# Patient Record
Sex: FEMALE | Race: WHITE | NOT HISPANIC OR LATINO | Employment: OTHER | URBAN - NONMETROPOLITAN AREA
[De-identification: names, ages, dates, MRNs, and addresses within clinical notes are randomized per-mention and may not be internally consistent; named-entity substitution may affect disease eponyms.]

---

## 2023-01-01 ENCOUNTER — APPOINTMENT (OUTPATIENT)
Dept: PRIMARY CARE | Facility: CLINIC | Age: 88
End: 2023-01-01
Payer: MEDICARE

## 2023-01-01 ENCOUNTER — TELEPHONE (OUTPATIENT)
Dept: PRIMARY CARE | Facility: CLINIC | Age: 88
End: 2023-01-01
Payer: MEDICARE

## 2023-01-01 ENCOUNTER — NURSING HOME VISIT (OUTPATIENT)
Dept: POST ACUTE CARE | Facility: EXTERNAL LOCATION | Age: 88
End: 2023-01-01
Payer: MEDICARE

## 2023-01-01 ENCOUNTER — OFFICE VISIT (OUTPATIENT)
Dept: PRIMARY CARE | Facility: CLINIC | Age: 88
End: 2023-01-01
Payer: MEDICARE

## 2023-01-01 ENCOUNTER — HOSPITAL ENCOUNTER (OUTPATIENT)
Dept: DATA CONVERSION | Facility: HOSPITAL | Age: 88
End: 2023-05-05
Attending: PAIN MEDICINE | Admitting: PAIN MEDICINE
Payer: MEDICARE

## 2023-01-01 VITALS
HEIGHT: 66 IN | HEART RATE: 72 BPM | BODY MASS INDEX: 20.89 KG/M2 | SYSTOLIC BLOOD PRESSURE: 110 MMHG | OXYGEN SATURATION: 98 % | DIASTOLIC BLOOD PRESSURE: 72 MMHG | WEIGHT: 130 LBS

## 2023-01-01 VITALS
HEART RATE: 68 BPM | DIASTOLIC BLOOD PRESSURE: 74 MMHG | BODY MASS INDEX: 21.16 KG/M2 | HEIGHT: 65 IN | SYSTOLIC BLOOD PRESSURE: 122 MMHG | WEIGHT: 127 LBS

## 2023-01-01 VITALS — BODY MASS INDEX: 21.26 KG/M2 | WEIGHT: 132.28 LBS | HEIGHT: 66 IN

## 2023-01-01 DIAGNOSIS — R52 PAIN, UNSPECIFIED: ICD-10-CM

## 2023-01-01 DIAGNOSIS — M54.14 RADICULOPATHY, THORACIC REGION: ICD-10-CM

## 2023-01-01 DIAGNOSIS — I63.9 CEREBROVASCULAR ACCIDENT (CVA), UNSPECIFIED MECHANISM (MULTI): Primary | ICD-10-CM

## 2023-01-01 DIAGNOSIS — I50.30 UNSPECIFIED DIASTOLIC (CONGESTIVE) HEART FAILURE (MULTI): ICD-10-CM

## 2023-01-01 DIAGNOSIS — I13.0 HYPERTENSIVE HEART AND CHRONIC KIDNEY DISEASE WITH HEART FAILURE AND STAGE 1 THROUGH STAGE 4 CHRONIC KIDNEY DISEASE, OR UNSPECIFIED CHRONIC KIDNEY DISEASE (MULTI): ICD-10-CM

## 2023-01-01 DIAGNOSIS — R63.0 POOR APPETITE: ICD-10-CM

## 2023-01-01 DIAGNOSIS — D64.9 ANEMIA, UNSPECIFIED TYPE: ICD-10-CM

## 2023-01-01 DIAGNOSIS — I10 BENIGN ESSENTIAL HTN: ICD-10-CM

## 2023-01-01 DIAGNOSIS — S22.31XA CLOSED FRACTURE OF ONE RIB OF RIGHT SIDE, INITIAL ENCOUNTER: ICD-10-CM

## 2023-01-01 DIAGNOSIS — M54.50 CHRONIC MIDLINE LOW BACK PAIN WITHOUT SCIATICA: ICD-10-CM

## 2023-01-01 DIAGNOSIS — R53.83 FATIGUE, UNSPECIFIED TYPE: ICD-10-CM

## 2023-01-01 DIAGNOSIS — N18.30 CRF (CHRONIC RENAL FAILURE), STAGE 3 (MODERATE) (MULTI): ICD-10-CM

## 2023-01-01 DIAGNOSIS — M54.50 CHRONIC BILATERAL LOW BACK PAIN WITHOUT SCIATICA: ICD-10-CM

## 2023-01-01 DIAGNOSIS — M80.00XA: ICD-10-CM

## 2023-01-01 DIAGNOSIS — R53.81 DEBILITY: ICD-10-CM

## 2023-01-01 DIAGNOSIS — Z12.31 ENCOUNTER FOR SCREENING MAMMOGRAM FOR MALIGNANT NEOPLASM OF BREAST: Primary | ICD-10-CM

## 2023-01-01 DIAGNOSIS — E46 PROTEIN-CALORIE MALNUTRITION, UNSPECIFIED SEVERITY (MULTI): ICD-10-CM

## 2023-01-01 DIAGNOSIS — S22.080A COMPRESSION FRACTURE OF T11 VERTEBRA, INITIAL ENCOUNTER (MULTI): ICD-10-CM

## 2023-01-01 DIAGNOSIS — R13.19 ESOPHAGEAL DYSPHAGIA: Primary | ICD-10-CM

## 2023-01-01 DIAGNOSIS — R53.83 FATIGUE, UNSPECIFIED TYPE: Primary | ICD-10-CM

## 2023-01-01 DIAGNOSIS — S22.069A: ICD-10-CM

## 2023-01-01 DIAGNOSIS — M54.6 CHRONIC BILATERAL THORACIC BACK PAIN: ICD-10-CM

## 2023-01-01 DIAGNOSIS — G89.29 CHRONIC MIDLINE LOW BACK PAIN WITHOUT SCIATICA: ICD-10-CM

## 2023-01-01 DIAGNOSIS — M48.05 SPINAL STENOSIS OF THORACOLUMBAR REGION: ICD-10-CM

## 2023-01-01 DIAGNOSIS — N18.9 CHRONIC KIDNEY DISEASE, UNSPECIFIED: ICD-10-CM

## 2023-01-01 DIAGNOSIS — I48.91 UNSPECIFIED ATRIAL FIBRILLATION (MULTI): ICD-10-CM

## 2023-01-01 DIAGNOSIS — S22.089A UNSPECIFIED FRACTURE OF T11-T12 VERTEBRA, INITIAL ENCOUNTER FOR CLOSED FRACTURE (MULTI): ICD-10-CM

## 2023-01-01 DIAGNOSIS — Z78.0 POST-MENOPAUSAL: ICD-10-CM

## 2023-01-01 DIAGNOSIS — R93.89 ABNORMAL CXR: Primary | ICD-10-CM

## 2023-01-01 DIAGNOSIS — G89.29 CHRONIC BILATERAL THORACIC BACK PAIN: ICD-10-CM

## 2023-01-01 DIAGNOSIS — M19.90 UNSPECIFIED OSTEOARTHRITIS, UNSPECIFIED SITE: ICD-10-CM

## 2023-01-01 DIAGNOSIS — M47.24 OSTEOARTHRITIS OF SPINE WITH RADICULOPATHY, THORACIC REGION: ICD-10-CM

## 2023-01-01 DIAGNOSIS — S22.079A: ICD-10-CM

## 2023-01-01 DIAGNOSIS — S32.000D COMPRESSION FRACTURE OF LUMBAR VERTEBRA WITH ROUTINE HEALING, UNSPECIFIED LUMBAR VERTEBRAL LEVEL, SUBSEQUENT ENCOUNTER: ICD-10-CM

## 2023-01-01 DIAGNOSIS — G47.33 OBSTRUCTIVE SLEEP APNEA (ADULT) (PEDIATRIC): ICD-10-CM

## 2023-01-01 DIAGNOSIS — Z00.00 ROUTINE GENERAL MEDICAL EXAMINATION AT HEALTH CARE FACILITY: ICD-10-CM

## 2023-01-01 DIAGNOSIS — G89.29 CHRONIC BILATERAL LOW BACK PAIN WITHOUT SCIATICA: ICD-10-CM

## 2023-01-01 DIAGNOSIS — I70.1 RAS (RENAL ARTERY STENOSIS) (CMS-HCC): ICD-10-CM

## 2023-01-01 DIAGNOSIS — I34.0 NONRHEUMATIC MITRAL (VALVE) INSUFFICIENCY: ICD-10-CM

## 2023-01-01 LAB
ALANINE AMINOTRANSFERASE (SGPT) (U/L) IN SER/PLAS: 18 U/L (ref 7–45)
ALBUMIN (G/DL) IN SER/PLAS: 2.9 G/DL (ref 3.4–5)
ALKALINE PHOSPHATASE (U/L) IN SER/PLAS: 74 U/L (ref 33–136)
AMMONIA (UMOL/L) IN PLASMA: 50 UMOL/L
ANION GAP IN SER/PLAS: 10 MMOL/L (ref 10–20)
ANION GAP IN SER/PLAS: 15 MMOL/L (ref 10–20)
ASPARTATE AMINOTRANSFERASE (SGOT) (U/L) IN SER/PLAS: 36 U/L (ref 9–39)
BILIRUBIN TOTAL (MG/DL) IN SER/PLAS: 1 MG/DL (ref 0–1.2)
CALCIUM (MG/DL) IN SER/PLAS: 9.2 MG/DL (ref 8.6–10.3)
CALCIUM (MG/DL) IN SER/PLAS: 9.2 MG/DL (ref 8.6–10.3)
CARBON DIOXIDE, TOTAL (MMOL/L) IN SER/PLAS: 23 MMOL/L (ref 21–32)
CARBON DIOXIDE, TOTAL (MMOL/L) IN SER/PLAS: 26 MMOL/L (ref 21–32)
CHLORIDE (MMOL/L) IN SER/PLAS: 105 MMOL/L (ref 98–107)
CHLORIDE (MMOL/L) IN SER/PLAS: 108 MMOL/L (ref 98–107)
CREATININE (MG/DL) IN SER/PLAS: 0.84 MG/DL (ref 0.5–1.05)
CREATININE (MG/DL) IN SER/PLAS: 1.23 MG/DL (ref 0.5–1.05)
GFR FEMALE: 43 ML/MIN/1.73M2
GFR FEMALE: 67 ML/MIN/1.73M2
GLUCOSE (MG/DL) IN SER/PLAS: 100 MG/DL (ref 74–99)
GLUCOSE (MG/DL) IN SER/PLAS: 130 MG/DL (ref 74–99)
POTASSIUM (MMOL/L) IN SER/PLAS: 4 MMOL/L (ref 3.5–5.3)
POTASSIUM (MMOL/L) IN SER/PLAS: 4 MMOL/L (ref 3.5–5.3)
PROTEIN TOTAL: 5.4 G/DL (ref 6.4–8.2)
SODIUM (MMOL/L) IN SER/PLAS: 137 MMOL/L (ref 136–145)
SODIUM (MMOL/L) IN SER/PLAS: 142 MMOL/L (ref 136–145)
URATE (MG/DL) IN SER/PLAS: 4.6 MG/DL (ref 2.3–6.7)
UREA NITROGEN (MG/DL) IN SER/PLAS: 22 MG/DL (ref 6–23)
UREA NITROGEN (MG/DL) IN SER/PLAS: 34 MG/DL (ref 6–23)

## 2023-01-01 PROCEDURE — 99308 SBSQ NF CARE LOW MDM 20: CPT | Performed by: INTERNAL MEDICINE

## 2023-01-01 PROCEDURE — 3078F DIAST BP <80 MM HG: CPT | Performed by: INTERNAL MEDICINE

## 2023-01-01 PROCEDURE — 99214 OFFICE O/P EST MOD 30 MIN: CPT | Performed by: INTERNAL MEDICINE

## 2023-01-01 PROCEDURE — 3074F SYST BP LT 130 MM HG: CPT | Performed by: INTERNAL MEDICINE

## 2023-01-01 PROCEDURE — 1160F RVW MEDS BY RX/DR IN RCRD: CPT | Performed by: INTERNAL MEDICINE

## 2023-01-01 PROCEDURE — 99304 1ST NF CARE SF/LOW MDM 25: CPT | Performed by: INTERNAL MEDICINE

## 2023-01-01 PROCEDURE — 1036F TOBACCO NON-USER: CPT | Performed by: INTERNAL MEDICINE

## 2023-01-01 PROCEDURE — 1159F MED LIST DOCD IN RCRD: CPT | Performed by: INTERNAL MEDICINE

## 2023-01-01 PROCEDURE — 99497 ADVNCD CARE PLAN 30 MIN: CPT | Performed by: INTERNAL MEDICINE

## 2023-01-01 PROCEDURE — G0439 PPPS, SUBSEQ VISIT: HCPCS | Performed by: INTERNAL MEDICINE

## 2023-01-01 PROCEDURE — 1170F FXNL STATUS ASSESSED: CPT | Performed by: INTERNAL MEDICINE

## 2023-01-01 RX ORDER — FENOFIBRATE 145 MG/1
1 TABLET, FILM COATED ORAL DAILY
COMMUNITY

## 2023-01-01 RX ORDER — FUROSEMIDE 20 MG/1
1 TABLET ORAL DAILY
COMMUNITY
End: 2023-01-01 | Stop reason: WASHOUT

## 2023-01-01 RX ORDER — DILTIAZEM HYDROCHLORIDE 240 MG/1
1 CAPSULE, EXTENDED RELEASE ORAL
COMMUNITY

## 2023-01-01 RX ORDER — LIDOCAINE 50 MG/G
1 PATCH TOPICAL DAILY
Qty: 30 PATCH | Refills: 11 | Status: SHIPPED | OUTPATIENT
Start: 2023-01-01 | End: 2023-09-20 | Stop reason: CLARIF

## 2023-01-01 RX ORDER — LIDOCAINE 50 MG/G
1 PATCH TOPICAL DAILY
Qty: 30 PATCH | Refills: 11 | Status: SHIPPED | OUTPATIENT
Start: 2023-01-01 | End: 2023-01-01 | Stop reason: SDUPTHER

## 2023-01-01 RX ORDER — CYCLOBENZAPRINE HCL 10 MG
10 TABLET ORAL 3 TIMES DAILY PRN
COMMUNITY
End: 2023-01-01 | Stop reason: WASHOUT

## 2023-01-01 RX ORDER — DOCUSATE SODIUM 100 MG/1
1 CAPSULE, LIQUID FILLED ORAL 2 TIMES DAILY PRN
COMMUNITY

## 2023-01-01 RX ORDER — ALENDRONATE SODIUM 70 MG/1
70 TABLET ORAL
Qty: 4 TABLET | Refills: 11 | Status: SHIPPED | OUTPATIENT
Start: 2023-01-01 | End: 2023-09-20 | Stop reason: CLARIF

## 2023-01-01 RX ORDER — LISINOPRIL 40 MG/1
1 TABLET ORAL DAILY
COMMUNITY

## 2023-01-01 RX ORDER — CARVEDILOL 12.5 MG/1
1 TABLET ORAL 2 TIMES DAILY
COMMUNITY

## 2023-01-01 RX ORDER — CELECOXIB 100 MG/1
1 CAPSULE ORAL EVERY 12 HOURS PRN
COMMUNITY
End: 2023-01-01 | Stop reason: WASHOUT

## 2023-01-01 RX ORDER — WARFARIN 3 MG/1
TABLET ORAL
COMMUNITY

## 2023-01-01 ASSESSMENT — ENCOUNTER SYMPTOMS
WEAKNESS: 0
CHILLS: 0
DYSURIA: 0
AGITATION: 0
ABDOMINAL DISTENTION: 0
SHORTNESS OF BREATH: 0
PALPITATIONS: 0
GASTROINTESTINAL NEGATIVE: 1
NEUROLOGICAL NEGATIVE: 1
WHEEZING: 0
VOMITING: 0
DIZZINESS: 0
WEAKNESS: 0
FEVER: 0
CONSTIPATION: 0
PSYCHIATRIC NEGATIVE: 1
AGITATION: 0
CONSTIPATION: 0
DYSURIA: 0
NAUSEA: 0
ENDOCRINE NEGATIVE: 1
FEVER: 0
GASTROINTESTINAL NEGATIVE: 1
ABDOMINAL PAIN: 0
LIGHT-HEADEDNESS: 0
VOMITING: 0
NAUSEA: 0
LIGHT-HEADEDNESS: 0
HEADACHES: 0
BACK PAIN: 1
WHEEZING: 0
FATIGUE: 1
BACK PAIN: 1
CARDIOVASCULAR NEGATIVE: 1
EYES NEGATIVE: 1
PSYCHIATRIC NEGATIVE: 1
CHILLS: 0
CARDIOVASCULAR NEGATIVE: 1
EYES NEGATIVE: 1
COUGH: 0
DIZZINESS: 0
HEADACHES: 0
ENDOCRINE NEGATIVE: 1
DIARRHEA: 0
DIARRHEA: 0
NEUROLOGICAL NEGATIVE: 1
PALPITATIONS: 0
RHINORRHEA: 0
SHORTNESS OF BREATH: 0
RHINORRHEA: 0
ABDOMINAL PAIN: 0
ABDOMINAL DISTENTION: 0
COUGH: 0

## 2023-01-01 ASSESSMENT — PATIENT HEALTH QUESTIONNAIRE - PHQ9
SUM OF ALL RESPONSES TO PHQ9 QUESTIONS 1 AND 2: 0
2. FEELING DOWN, DEPRESSED OR HOPELESS: SEVERAL DAYS
SUM OF ALL RESPONSES TO PHQ9 QUESTIONS 1 AND 2: 0
1. LITTLE INTEREST OR PLEASURE IN DOING THINGS: NOT AT ALL
1. LITTLE INTEREST OR PLEASURE IN DOING THINGS: NOT AT ALL
2. FEELING DOWN, DEPRESSED OR HOPELESS: NOT AT ALL
SUM OF ALL RESPONSES TO PHQ9 QUESTIONS 1 AND 2: 2
1. LITTLE INTEREST OR PLEASURE IN DOING THINGS: SEVERAL DAYS
2. FEELING DOWN, DEPRESSED OR HOPELESS: NOT AT ALL

## 2023-01-01 ASSESSMENT — ACTIVITIES OF DAILY LIVING (ADL)
TAKING_MEDICATION: NEEDS ASSISTANCE
DOING_HOUSEWORK: NEEDS ASSISTANCE
MANAGING_FINANCES: NEEDS ASSISTANCE
MANAGING_FINANCES: NEEDS ASSISTANCE
TAKING_MEDICATION: NEEDS ASSISTANCE
GROCERY_SHOPPING: NEEDS ASSISTANCE
DRESSING: INDEPENDENT
DOING_HOUSEWORK: NEEDS ASSISTANCE
BATHING: NEEDS ASSISTANCE
DRESSING: INDEPENDENT
GROCERY_SHOPPING: NEEDS ASSISTANCE
BATHING: NEEDS ASSISTANCE

## 2023-02-10 PROBLEM — M75.42 IMPINGEMENT SYNDROME OF LEFT SHOULDER: Status: ACTIVE | Noted: 2023-01-01

## 2023-02-10 PROBLEM — M19.90 ARTHRITIS: Status: ACTIVE | Noted: 2023-01-01

## 2023-02-10 PROBLEM — M54.6 THORACIC BACK PAIN: Status: ACTIVE | Noted: 2023-01-01

## 2023-02-10 PROBLEM — N17.9 AKI (ACUTE KIDNEY INJURY) (CMS-HCC): Status: ACTIVE | Noted: 2023-01-01

## 2023-02-10 PROBLEM — S93.401A SPRAIN OF ANKLE, RIGHT: Status: RESOLVED | Noted: 2023-01-01 | Resolved: 2023-01-01

## 2023-02-10 PROBLEM — S52.601A CLOSED FRACTURE OF LOWER END OF RIGHT ULNA: Status: ACTIVE | Noted: 2023-01-01

## 2023-02-10 PROBLEM — S93.401A SPRAIN OF ANKLE, RIGHT: Status: ACTIVE | Noted: 2023-01-01

## 2023-02-10 PROBLEM — M25.551 HIP PAIN, RIGHT: Status: ACTIVE | Noted: 2023-01-01

## 2023-02-10 PROBLEM — M54.50 LUMBOSACRAL PAIN: Status: RESOLVED | Noted: 2023-01-01 | Resolved: 2023-01-01

## 2023-02-10 PROBLEM — I50.20 SYSTOLIC CHF (MULTI): Status: ACTIVE | Noted: 2023-01-01

## 2023-02-10 PROBLEM — R63.0 POOR APPETITE: Status: ACTIVE | Noted: 2023-01-01

## 2023-02-10 PROBLEM — J90 PLEURAL EFFUSION: Status: RESOLVED | Noted: 2023-01-01 | Resolved: 2023-01-01

## 2023-02-10 PROBLEM — M54.50 LUMBOSACRAL PAIN: Status: ACTIVE | Noted: 2023-01-01

## 2023-02-10 PROBLEM — J40 BRONCHITIS: Status: RESOLVED | Noted: 2023-01-01 | Resolved: 2023-01-01

## 2023-02-10 PROBLEM — E78.5 HYPERLIPIDEMIA: Status: ACTIVE | Noted: 2023-01-01

## 2023-02-10 PROBLEM — J90 PLEURAL EFFUSION: Status: ACTIVE | Noted: 2023-01-01

## 2023-02-10 PROBLEM — N17.9 AKI (ACUTE KIDNEY INJURY) (CMS-HCC): Status: RESOLVED | Noted: 2023-01-01 | Resolved: 2023-01-01

## 2023-02-10 PROBLEM — N39.0 ACUTE UTI: Status: RESOLVED | Noted: 2023-01-01 | Resolved: 2023-01-01
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
## 2023-02-10 PROBLEM — N39.3 SUI (STRESS URINARY INCONTINENCE, FEMALE): Status: ACTIVE | Noted: 2023-01-01

## 2023-02-10 PROBLEM — M54.9 BACK PAIN: Status: ACTIVE | Noted: 2023-01-01

## 2023-02-10 PROBLEM — I48.91 ATRIAL FIBRILLATION (MULTI): Status: ACTIVE | Noted: 2023-01-01

## 2023-02-10 PROBLEM — M25.531 ACUTE PAIN OF RIGHT WRIST: Status: RESOLVED | Noted: 2023-01-01 | Resolved: 2023-01-01

## 2023-02-10 PROBLEM — N18.32 STAGE 3B CHRONIC KIDNEY DISEASE (MULTI): Status: RESOLVED | Noted: 2023-01-01 | Resolved: 2023-01-01

## 2023-02-10 PROBLEM — M47.814 DEGENERATIVE JOINT DISEASE OF THORACIC SPINE: Status: ACTIVE | Noted: 2023-01-01

## 2023-02-10 PROBLEM — N18.9 CRD (CHRONIC RENAL DISEASE): Status: ACTIVE | Noted: 2023-01-01

## 2023-02-10 PROBLEM — I10 HYPERTENSION, MALIGNANT: Status: ACTIVE | Noted: 2023-01-01

## 2023-02-10 PROBLEM — T07.XXXA MULTIPLE FRACTURE: Status: RESOLVED | Noted: 2023-01-01 | Resolved: 2023-01-01
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
## 2023-02-10 PROBLEM — N18.30 CRF (CHRONIC RENAL FAILURE), STAGE 3 (MODERATE) (MULTI): Status: ACTIVE | Noted: 2023-01-01

## 2023-02-10 PROBLEM — R79.89 ELEVATED SERUM CREATININE: Status: RESOLVED | Noted: 2023-01-01 | Resolved: 2023-01-01

## 2023-02-10 PROBLEM — I10 BENIGN ESSENTIAL HTN: Status: ACTIVE | Noted: 2023-01-01

## 2023-02-10 PROBLEM — R63.4 WEIGHT LOSS: Status: ACTIVE | Noted: 2023-01-01

## 2023-02-10 PROBLEM — M79.601 PAIN OF RIGHT UPPER EXTREMITY: Status: ACTIVE | Noted: 2023-01-01

## 2023-02-10 PROBLEM — S20.211A RIB CONTUSION, RIGHT, INITIAL ENCOUNTER: Status: ACTIVE | Noted: 2023-01-01

## 2023-02-10 PROBLEM — N18.32 STAGE 3B CHRONIC KIDNEY DISEASE (MULTI): Status: ACTIVE | Noted: 2023-01-01

## 2023-02-10 PROBLEM — I50.30 (HFPEF) HEART FAILURE WITH PRESERVED EJECTION FRACTION (MULTI): Status: ACTIVE | Noted: 2023-01-01

## 2023-02-10 PROBLEM — R79.89 ELEVATED SERUM CREATININE: Status: ACTIVE | Noted: 2023-01-01

## 2023-02-10 PROBLEM — M54.50 CHRONIC LOW BACK PAIN: Status: ACTIVE | Noted: 2023-01-01

## 2023-02-10 PROBLEM — R53.83 FATIGUE: Status: ACTIVE | Noted: 2023-01-01

## 2023-02-10 PROBLEM — Z99.89 CPAP (CONTINUOUS POSITIVE AIRWAY PRESSURE) DEPENDENCE: Status: ACTIVE | Noted: 2023-01-01

## 2023-02-10 PROBLEM — J40 BRONCHITIS: Status: ACTIVE | Noted: 2023-01-01

## 2023-02-10 PROBLEM — Z99.89 CPAP (CONTINUOUS POSITIVE AIRWAY PRESSURE) DEPENDENCE: Status: RESOLVED | Noted: 2023-01-01 | Resolved: 2023-01-01

## 2023-02-10 PROBLEM — G47.33 OBSTRUCTIVE SLEEP APNEA, ADULT: Status: ACTIVE | Noted: 2023-01-01

## 2023-02-10 PROBLEM — S20.01XA CONTUSION OF RIGHT BREAST: Status: RESOLVED | Noted: 2023-01-01 | Resolved: 2023-01-01

## 2023-02-10 PROBLEM — I10 HYPERTENSION, MALIGNANT: Status: RESOLVED | Noted: 2023-01-01 | Resolved: 2023-01-01

## 2023-02-10 PROBLEM — N18.9 CRD (CHRONIC RENAL DISEASE): Status: RESOLVED | Noted: 2023-01-01 | Resolved: 2023-01-01

## 2023-02-10 PROBLEM — M54.9 BACK PAIN: Status: RESOLVED | Noted: 2023-01-01 | Resolved: 2023-01-01

## 2023-02-10 PROBLEM — M79.601 PAIN OF RIGHT UPPER EXTREMITY: Status: RESOLVED | Noted: 2023-01-01 | Resolved: 2023-01-01

## 2023-02-10 PROBLEM — N39.0 ACUTE UTI: Status: ACTIVE | Noted: 2023-01-01

## 2023-02-10 PROBLEM — S20.211A RIB CONTUSION, RIGHT, INITIAL ENCOUNTER: Status: RESOLVED | Noted: 2023-01-01 | Resolved: 2023-01-01

## 2023-02-10 PROBLEM — K21.9 GERD (GASTROESOPHAGEAL REFLUX DISEASE): Status: ACTIVE | Noted: 2023-01-01

## 2023-02-10 PROBLEM — R35.1 NOCTURIA: Status: ACTIVE | Noted: 2023-01-01

## 2023-02-10 PROBLEM — T07.XXXA MULTIPLE FRACTURE: Status: ACTIVE | Noted: 2023-01-01

## 2023-02-10 PROBLEM — R79.89 ELEVATED TROPONIN: Status: ACTIVE | Noted: 2023-01-01

## 2023-02-10 PROBLEM — N20.0 KIDNEY STONES: Status: ACTIVE | Noted: 2023-01-01
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
## 2023-02-10 PROBLEM — J06.9 ACUTE UPPER RESPIRATORY INFECTION: Status: RESOLVED | Noted: 2023-01-01 | Resolved: 2023-01-01

## 2023-02-10 PROBLEM — M19.90 ARTHRITIS: Status: RESOLVED | Noted: 2023-01-01 | Resolved: 2023-01-01

## 2023-02-10 PROBLEM — G89.29 CHRONIC LOW BACK PAIN: Status: ACTIVE | Noted: 2023-01-01

## 2023-02-10 PROBLEM — J06.9 ACUTE UPPER RESPIRATORY INFECTION: Status: ACTIVE | Noted: 2023-01-01

## 2023-02-10 PROBLEM — R31.9 HEMATURIA: Status: RESOLVED | Noted: 2023-01-01 | Resolved: 2023-01-01

## 2023-02-10 PROBLEM — S72.141A CLOSED DISPLACED INTERTROCHANTERIC FRACTURE OF RIGHT FEMUR (MULTI): Status: ACTIVE | Noted: 2023-01-01

## 2023-02-10 PROBLEM — R31.9 HEMATURIA: Status: ACTIVE | Noted: 2023-01-01

## 2023-02-10 PROBLEM — S20.01XA CONTUSION OF RIGHT BREAST: Status: ACTIVE | Noted: 2023-01-01
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
## 2023-02-10 PROBLEM — R26.89 IMBALANCE: Status: ACTIVE | Noted: 2023-01-01

## 2023-02-10 PROBLEM — I70.1 RAS (RENAL ARTERY STENOSIS) (CMS-HCC): Status: ACTIVE | Noted: 2023-01-01

## 2023-02-10 PROBLEM — M25.531 ACUTE PAIN OF RIGHT WRIST: Status: ACTIVE | Noted: 2023-01-01

## 2023-03-08 NOTE — PROGRESS NOTES
"Subjective   Reason for Visit: Bushra Patel is an 88 y.o. female here for a Medicare Wellness visit.     Past Medical, Surgical, and Family History reviewed and updated in chart.    Reviewed all medications by prescribing practitioner or clinical pharmacist (such as prescriptions, OTCs, herbal therapies and supplements) and documented in the medical record.    HPI  F/U  AFTER ER VISIT FOR LOW BACK PAIN. MEDICARE WELLNESS                      Patient Care Team:  Allie Griffin MD as PCP - General  Nicolasa Levin DO as PCP - [facility] ACO Attributed Provider     Review of Systems    Objective   Vitals:  /74   Pulse 68   Ht 1.651 m (5' 5\")   Wt 57.6 kg (127 lb)   BMI 21.13 kg/m²       Physical Exam    Assessment/Plan   Problem List Items Addressed This Visit          Circulatory    FILIBERTO (renal artery stenosis) (CMS/HCC)    Current Assessment & Plan     STABLE.            Genitourinary    CRF (chronic renal failure), stage 3 (moderate)    Current Assessment & Plan     STABLE.          Other Visit Diagnoses       Hypertensive heart and chronic kidney disease with heart failure and stage 1 through stage 4 chronic kidney disease, or unspecified chronic kidney disease (CMS/[facility])              ALL ASSESSED CHRONIC CONDITIONS ARE STABLE OR ADDRESSED.        Advance Care Planning   ***             "

## 2023-03-08 NOTE — LETTER
March 8, 2023     Patient: Bushra Ptael   YOB: 1935   Date of Visit: 3/8/2023       To Whom It May Concern:    Bushra Patel was seen in my clinic on 3/8/2023 at 1:00 pm. Please excuse Bushra for her absence from work on this day to make the appointment.    If you have any questions or concerns, please don't hesitate to call.         Sincerely,         Allie Griffin MD        CC: No Recipients

## 2023-03-08 NOTE — PROGRESS NOTES
Subjective   Patient ID: Bushra Patel is a 88 y.o. female who presents for Follow-up (1 MONTH FOLLOW UP - NO LABS DONE. PATIENT WAS REFERRED TO DR. IRVIN (NEPHROLOGY) FOR CRF - SCHEDULED FOR ULTRASOUND + LABS. ) and Medicare Annual Wellness Visit Subsequent (MEDICARE WELLNESS).  HPI    Review of Systems    Objective   Physical Exam    Assessment/Plan   Problem List Items Addressed This Visit          Circulatory    FILIBERTO (renal artery stenosis) (CMS/HCC)     STABLE.            Genitourinary    CRF (chronic renal failure), stage 3 (moderate)     STABLE.          Other Visit Diagnoses       Hypertensive heart and chronic kidney disease with heart failure and stage 1 through stage 4 chronic kidney disease, or unspecified chronic kidney disease (CMS/[facility])             ALL ASSESSED CHRONIC CONDITIONS ARE STABLE OR ADDRESSED.

## 2023-03-09 NOTE — PROGRESS NOTES
"Subjective   Reason for Visit: Bushra Patel is an 88 y.o. female here for a Medicare Wellness visit.     Past Medical, Surgical, and Family History reviewed and updated in chart.    Reviewed all medications by prescribing practitioner or clinical pharmacist (such as prescriptions, OTCs, herbal therapies and supplements) and documented in the medical record.    HPI  F/U AFTER ER VISIT FOR LOW BACK PAIN. LAB F/U (WASN'T DONE). STILL HAS THE PAIN ON AND OFF 5/10. NO H/O TRAUMA. MEDICARE WELLNESS EXAM.  Patient Self Assessment of Health Status  Patient Self Assessment: Good    Nutrition and Exercise  Current Diet: Well Balanced Diet  Exercise Frequency: Infrequently    Functional Ability/Level of Safety  Cognitive Impairment Observed: No cognitive impairment observed  Cognitive Impairment Reported: No cognitive impairment reported by patient or family    Home Safety Risk Factors: None    Patient Care Team:  Allie Griffin MD as PCP - General Nicolasa Levin DO as PCP - [facility] Attributed Provider     Review of Systems   Constitutional:  Negative for chills and fever.   HENT: Negative.  Negative for congestion, postnasal drip and rhinorrhea.    Eyes: Negative.  Negative for visual disturbance.   Respiratory:  Negative for cough, shortness of breath and wheezing.    Cardiovascular: Negative.  Negative for chest pain, palpitations and leg swelling.   Gastrointestinal: Negative.  Negative for abdominal distention, abdominal pain, constipation, diarrhea, nausea and vomiting.   Endocrine: Negative.    Genitourinary:  Negative for dysuria and urgency.   Musculoskeletal:  Positive for back pain.   Skin: Negative.  Negative for rash.   Allergic/Immunologic: Negative for immunocompromised state.   Neurological: Negative.  Negative for dizziness, weakness, light-headedness and headaches.   Psychiatric/Behavioral: Negative.  Negative for agitation.        Objective   Vitals:  /74   Pulse 68   Ht 1.651 m (5' 5\")  "  Wt 57.6 kg (127 lb)   BMI 21.13 kg/m²       Physical Exam  Constitutional:       General: She is not in acute distress.  HENT:      Head: Normocephalic.      Nose: Nose normal.      Mouth/Throat:      Mouth: Mucous membranes are moist.   Eyes:      Conjunctiva/sclera: Conjunctivae normal.      Pupils: Pupils are equal, round, and reactive to light.   Cardiovascular:      Rate and Rhythm: Normal rate and regular rhythm.      Pulses: Normal pulses.      Heart sounds: Normal heart sounds.   Pulmonary:      Effort: No respiratory distress.      Breath sounds: No wheezing.   Chest:      Chest wall: No tenderness.   Abdominal:      General: Abdomen is flat. Bowel sounds are normal.      Palpations: Abdomen is soft.      Tenderness: There is no abdominal tenderness.   Musculoskeletal:         General: Tenderness present. Normal range of motion.      Cervical back: Normal range of motion.   Lymphadenopathy:      Cervical: No cervical adenopathy.   Skin:     General: Skin is warm and dry.      Findings: No rash.   Neurological:      General: No focal deficit present.      Mental Status: She is alert. Mental status is at baseline.   Psychiatric:         Mood and Affect: Mood normal.         Behavior: Behavior normal.         Assessment/Plan   Problem List Items Addressed This Visit          Circulatory    FILIBERTO (renal artery stenosis) (CMS/HCC)    Current Assessment & Plan     STABLE.            Genitourinary    CRF (chronic renal failure), stage 3 (moderate)    Current Assessment & Plan     STABLE.            Other    Chronic low back pain    Relevant Medications    lidocaine (Lidoderm) 5 % patch     Other Visit Diagnoses       Encounter for screening mammogram for malignant neoplasm of breast    -  Primary    Relevant Orders    BI mammo bilateral screening tomosynthesis    Hypertensive heart and chronic kidney disease with heart failure and stage 1 through stage 4 chronic kidney disease, or unspecified chronic kidney  disease (CMS/[facility])        Post-menopausal        Relevant Orders    XR DEXA bone density    Routine general medical examination at health care facility            TEST RESULTS WERE DISCUSSED.  ALL ASSESSED CHRONIC CONDITIONS ARE STABLE OR ADDRESSED.  Advanced Care Planing discussed, diagnosis , treatment and prognosis discussed with pt,pt has capacity to make own decision, pt has a living will, to bring a copy for the chart.  WILL RESCHEDULE THE RENAL U/S.   PT IS UP TO DATE WITH FLU SHOT AND PNEUMONIA VACCINE , REFUSED SHINGLES VACCINE.         MDM    1) COMPLEXITY: MORE THAN 1 STABLE CHRONIC CONDITION ADDRESSED  2)DATA: TESTS INTERPRETED AND OR ORDERED, TOOK INDEPENDENT HISTORY OR RECORDS REVIEWED  3)RISK: MODERATE RISK DUE TO NATURE OF MEDICAL CONDITIONS/COMORBIDITY OR MEDICATIONS ORDERED OR SURGICAL OR PROCEDURE REFERRAL, .

## 2023-04-27 PROBLEM — S22.089A: Status: ACTIVE | Noted: 2023-01-01

## 2023-04-27 PROBLEM — M47.817 LUMBOSACRAL SPONDYLOSIS WITHOUT MYELOPATHY: Status: ACTIVE | Noted: 2023-01-01

## 2023-04-27 PROBLEM — S22.31XA RIGHT RIB FRACTURE: Status: ACTIVE | Noted: 2023-01-01

## 2023-04-27 PROBLEM — S32.000A LUMBAR COMPRESSION FRACTURE (MULTI): Status: ACTIVE | Noted: 2023-01-01

## 2023-05-02 PROBLEM — E46 PROTEIN-CALORIE MALNUTRITION, UNSPECIFIED SEVERITY (MULTI): Status: ACTIVE | Noted: 2023-01-01

## 2023-05-02 PROBLEM — M48.05 SPINAL STENOSIS OF THORACOLUMBAR REGION: Status: ACTIVE | Noted: 2023-01-01

## 2023-05-02 PROBLEM — I50.20 SYSTOLIC CHF (MULTI): Status: RESOLVED | Noted: 2023-01-01 | Resolved: 2023-01-01

## 2023-05-02 NOTE — PROGRESS NOTES
Subjective   Patient ID: Bushra Patel is a 88 y.o. female who presents for Follow-up (After xray ).  HPI  F/U AFTER ER VISIT FOR ACUTE ON CHRONIC LOW BACK AND UPPER BACK PAIN . HAD SEVERAL XRAYS DONE . CURRENTLY HAS F/U WITH PAIN CLINIC FOR LOW BACK PAIN AND COMPRESSION FRACTURE , HAD MRI OF LUMBAR AND THORACIC SPINE. HAS CHRONIC FATIGUE ,POOR APPETITE, PAIN (UPPER AND LOWER BACK) IS  STILL PRESENT 6-7/10 ON AND OFF. PT IS HERE [facility] DAUGHTER TO DISCUSS THE CXR. DENIES HAVING SOB , NO COUGH.  Review of Systems   Constitutional:  Positive for fatigue. Negative for chills and fever.        POOR APPETITE   HENT: Negative.  Negative for congestion, postnasal drip and rhinorrhea.    Eyes: Negative.  Negative for visual disturbance.   Respiratory:  Negative for cough, shortness of breath and wheezing.    Cardiovascular: Negative.  Negative for chest pain, palpitations and leg swelling.   Gastrointestinal: Negative.  Negative for abdominal distention, abdominal pain, constipation, diarrhea, nausea and vomiting.   Endocrine: Negative.    Genitourinary:  Negative for dysuria and urgency.   Musculoskeletal:  Positive for back pain.   Skin: Negative.  Negative for rash.   Allergic/Immunologic: Negative for immunocompromised state.   Neurological: Negative.  Negative for dizziness, weakness, light-headedness and headaches.   Psychiatric/Behavioral: Negative.  Negative for agitation.        Objective   Physical Exam  Constitutional:       General: She is not in acute distress.  HENT:      Head: Normocephalic.      Nose: Nose normal.      Mouth/Throat:      Mouth: Mucous membranes are moist.   Eyes:      Conjunctiva/sclera: Conjunctivae normal.      Pupils: Pupils are equal, round, and reactive to light.   Cardiovascular:      Rate and Rhythm: Normal rate and regular rhythm.      Pulses: Normal pulses.      Heart sounds: Normal heart sounds.   Pulmonary:      Effort: No respiratory distress.      Breath sounds: No wheezing.    Chest:      Chest wall: No tenderness.   Abdominal:      General: Abdomen is flat. Bowel sounds are normal.      Palpations: Abdomen is soft.      Tenderness: There is no abdominal tenderness.   Musculoskeletal:         General: Tenderness present. Normal range of motion.      Cervical back: Normal range of motion.      Comments: T-SPINE AND L/S TENDERNESS   Lymphadenopathy:      Cervical: No cervical adenopathy.   Skin:     General: Skin is warm and dry.      Findings: No rash.   Neurological:      General: No focal deficit present.      Mental Status: She is alert. Mental status is at baseline.   Psychiatric:         Mood and Affect: Mood normal.         Behavior: Behavior normal.         Assessment/Plan   1. Abnormal CXR  CT chest wo IV contrast    CT chest wo IV contrast      2. 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 Anemia, unspecified type  CBC and Auto Differential    Vitamin B12    Methylmalonic Acid    Folate    Iron and TIBC    Ferritin      4. Fatigue, unspecified type  Comprehensive Metabolic Panel    TSH with reflex to Free T4 if abnormal    CBC and Auto Differential    Vitamin B12    Methylmalonic Acid    Folate    Iron and TIBC    Ferritin    Magnesium    Zinc      5. Compression fracture of T11 vertebra, initial encounter (CMS/[facility])  Seat lift mechanism incorporated into a combination lift-chair mechanism      6. Chronic bilateral low back pain without sciatica  Seat lift mechanism incorporated into a combination lift-chair mechanism      7. Chronic bilateral thoracic back pain  Seat lift mechanism incorporated into a combination lift-chair mechanism      8. Benign essential HTN        9. CRF (chronic renal failure), stage 3 (moderate) (CMS/[facility])        10. Compression fracture of lumbar vertebra with routine healing, unspecified lumbar vertebral level, subsequent encounter        11. Osteoarthritis of spine with radiculopathy, thoracic  region        12. Closed fracture of one rib of right side, initial encounter        13. Poor appetite        14. Protein-calorie malnutrition, unspecified severity (CMS/HCC)        15. Spinal stenosis of thoracolumbar region          TEST RESULTS WERE DISCUSSED.  ADVISED TO HAVE LOW FAT AND LOW CALORIE DIET AND TO LOOSE WEIGHT, DAILY EXERCISE.  ADVISED FOR FALL PRECAUTION.  ADVISED TO TAKE OTC CA+D 600 MG BID AND TO START THE FOSAMAX.  ADVISED TO APPLY WARM COMPRESSION AND OTC PAIN CREAM PRN FOR PAIN AND TO KEEP THE F/U WITH PAIN CLINIC.    MDM    1) COMPLEXITY: 1 UNDIAGNOSED NEW PROBLEM WITH UNCERTAIN PROGNOSIS  2)DATA: TESTS INTERPRETED AND OR ORDERED, TOOK INDEPENDENT HISTORY OR RECORDS REVIEWED  3)RISK: MODERATE RISK DUE TO NATURE OF MEDICAL CONDITIONS/COMORBIDITY OR MEDICATIONS ORDERED OR SURGICAL OR PROCEDURE REFERRAL, .       2 WEEKS

## 2023-05-17 NOTE — PROGRESS NOTES
Pt was seen in the NH,87 YO F WITH PMH CHF  AFIB LIOR CKD ANEMIA HERE FOR REHAB AFTER CVA,Pt is doing fine , no complaint  General appearance: Comfortable, no distress  ROS: No SOB  Medications reviewed  Head: Normal  Neck: Soft  Heart: Regular  Lungs: Clear  Abdomen: soft  NEURO SLOW IN RESPONSE BUT AWAKE AND ALERT , MOVES ALL EXT NO FOCAL DEFICIT NOTICED    Impression: REHAB, SUPPORTIVE CARE, continue current management    Problem List Items Addressed This Visit          Circulatory    Benign essential HTN     Other Visit Diagnoses       Cerebrovascular accident (CVA), unspecified mechanism (CMS/HCC)    -  Primary

## 2023-05-17 NOTE — LETTER
Patient: Bushra Patel  : 1935    Encounter Date: 2023    Pt was seen in the NH,87 YO F WITH PMH CHF  AFIB LIOR CKD ANEMIA HERE FOR REHAB AFTER CVA,Pt is doing fine , no complaint  General appearance: Comfortable, no distress  ROS: No SOB  Medications reviewed  Head: Normal  Neck: Soft  Heart: Regular  Lungs: Clear  Abdomen: soft  NEURO SLOW IN RESPONSE BUT AWAKE AND ALERT , MOVES ALL EXT NO FOCAL DEFICIT NOTICED    Impression: REHAB, SUPPORTIVE CARE, continue current management    Problem List Items Addressed This Visit          Circulatory    Benign essential HTN     Other Visit Diagnoses       Cerebrovascular accident (CVA), unspecified mechanism (CMS/HCC)    -  Primary               Electronically Signed By: Alfonzo Dumont MD   23  4:14 PM

## 2023-05-24 NOTE — LETTER
Patient: Bushra Patel  : 1935    Encounter Date: 2023    Pt was seen in the NH,Pt co fatigue and tired last 2 days  General appearance: Comfortable, no distress  ROS: No SOB  Medications reviewed  Head: Normal  Neck: Soft  Heart: Regular  Lungs: Clear  Abdomen: soft    Impression: BW UA CXR, continue current management    Problem List Items Addressed This Visit          Genitourinary    CRF (chronic renal failure), stage 3 (moderate) (CMS/HCC)       Other    Fatigue - Primary          Electronically Signed By: Alfonzo Dumont MD   23  6:24 PM

## 2023-05-24 NOTE — PROGRESS NOTES
Pt was seen in the NH,Pt co fatigue and tired last 2 days  General appearance: Comfortable, no distress  ROS: No SOB  Medications reviewed  Head: Normal  Neck: Soft  Heart: Regular  Lungs: Clear  Abdomen: soft    Impression: BW UA CXR, continue current management    Problem List Items Addressed This Visit          Genitourinary    CRF (chronic renal failure), stage 3 (moderate) (CMS/HCC)       Other    Fatigue - Primary

## 2023-06-02 NOTE — LETTER
Patient: Bushra Patel  : 1935    Encounter Date: 2023    Late entry( the note was entered in error in another pt chart, was corrected today as below)    Progress Notes  Alfonzo Dumont MD (Physician) • Primary Care  Pt was seen in the NH,75 yo f with PMH afib  CKD HTN CHF CVA here for rehab Pt has poor oral intake, not able to swallow well has lost wt, daughter concern of wt loss and difficulty eating  General appearance: Comfortable, no distress  ROS: No SOB  Medications reviewed  Head: Normal  Neck: Soft  Heart: Regular  Lungs: Clear  Abdomen: soft     Impression: spoke with daughter, discussed peg tube placement versus comfort type of care such as hospice, she wants peg tube, continue current management     Problem List Items Addressed This Visit               Problem List Items Addressed This Visit          Digestive    Esophageal dysphagia - Primary    Relevant Orders    EGD w PEG Tube Placement     Other Visit Diagnoses       Debility                   Electronically Signed By: Alfonzo Dumont MD   23  5:55 PM

## 2023-06-05 PROBLEM — R13.19 ESOPHAGEAL DYSPHAGIA: Status: ACTIVE | Noted: 2023-01-01

## 2023-06-05 NOTE — PROGRESS NOTES
Late entry( the note was entered in error in another pt chart, was corrected today as below)    Progress Notes  Alfonzo Dumont MD (Physician)  Primary Care  Pt was seen in the NH,75 yo f with PMH afib  CKD HTN CHF CVA here for rehab Pt has poor oral intake, not able to swallow well has lost wt, daughter concern of wt loss and difficulty eating  General appearance: Comfortable, no distress  ROS: No SOB  Medications reviewed  Head: Normal  Neck: Soft  Heart: Regular  Lungs: Clear  Abdomen: soft     Impression: spoke with daughter, discussed peg tube placement versus comfort type of care such as hospice, she wants peg tube, continue current management     Problem List Items Addressed This Visit               Problem List Items Addressed This Visit          Digestive    Esophageal dysphagia - Primary    Relevant Orders    EGD w PEG Tube Placement     Other Visit Diagnoses       Debility

## 2023-06-05 NOTE — TELEPHONE ENCOUNTER
I need the EGD order in for peg tube placement this Wednesday at 7:30am so I can put on schedule. ASAP  Thanks.

## 2023-06-05 NOTE — TELEPHONE ENCOUNTER
FAMILY HAS DECIDED NOT TO HAVE PEG TUBE PLACED SO I HAVE CANCELLED THIS WITH DR. IRVIN'S OFFICE.  SO NO NEED TO PLACE ORDER NOW.